# Patient Record
Sex: FEMALE | Race: WHITE | NOT HISPANIC OR LATINO | URBAN - METROPOLITAN AREA
[De-identification: names, ages, dates, MRNs, and addresses within clinical notes are randomized per-mention and may not be internally consistent; named-entity substitution may affect disease eponyms.]

---

## 2018-08-08 ENCOUNTER — OFFICE VISIT (OUTPATIENT)
Dept: URBAN - METROPOLITAN AREA CLINIC 193 | Facility: CLINIC | Age: 78
End: 2018-08-08
Payer: MEDICARE

## 2018-08-08 DIAGNOSIS — H52.223 REGULAR ASTIGMATISM, BILATERAL: ICD-10-CM

## 2018-08-08 PROCEDURE — 92134 CPTRZ OPH DX IMG PST SGM RTA: CPT | Performed by: OPTOMETRIST

## 2018-08-08 PROCEDURE — 92014 COMPRE OPH EXAM EST PT 1/>: CPT | Performed by: OPTOMETRIST

## 2018-08-08 ASSESSMENT — VISUAL ACUITY
OD: 20/30
OS: 20/25-

## 2018-08-08 ASSESSMENT — INTRAOCULAR PRESSURE
OD: 14
OS: 14

## 2018-08-08 NOTE — IMPRESSION/PLAN
Impression: Diagnosis: Nonexudative age-related macular degeneration, bilateral, early dry stage. Code: P19.1073. Hx of exudative/heme OS, grey SR lesion OD. Pt never went to see Dr. Baljeet Trujillo. OCT mac 8/8/18: no SRF/IRF Plan: Continue to monitor. Discused Amsler grid. Continue AREDS. Call if vision worsens.

## 2019-09-09 ENCOUNTER — OFFICE VISIT (OUTPATIENT)
Dept: URBAN - METROPOLITAN AREA CLINIC 71 | Facility: CLINIC | Age: 79
End: 2019-09-09
Payer: MEDICARE

## 2019-09-09 PROCEDURE — 92014 COMPRE OPH EXAM EST PT 1/>: CPT | Performed by: OPTOMETRIST

## 2019-09-09 PROCEDURE — 92134 CPTRZ OPH DX IMG PST SGM RTA: CPT | Performed by: OPTOMETRIST

## 2019-09-09 ASSESSMENT — INTRAOCULAR PRESSURE
OD: 10
OS: 11

## 2019-09-09 NOTE — IMPRESSION/PLAN
Impression: Diagnosis: Nonexudative age-related macular degeneration, bilateral, early dry stage. Code: D91.3333. Hx of exudative/heme OS, grey SR lesion OD. OCT mac 9/9/2019: no SRF/IRF OU Plan: Continue to monitor. Discussed Amsler grid. Continue AREDS. Call if vision worsens.

## 2021-06-18 ENCOUNTER — OFFICE VISIT (OUTPATIENT)
Dept: URBAN - METROPOLITAN AREA CLINIC 72 | Facility: CLINIC | Age: 81
End: 2021-06-18
Payer: MEDICARE

## 2021-06-18 DIAGNOSIS — H01.009 BLEPHARITIS OF EYELID: ICD-10-CM

## 2021-06-18 DIAGNOSIS — H01.8 OTHER SPECIFIED INFLAMMATIONS OF EYELID: Primary | ICD-10-CM

## 2021-06-18 PROCEDURE — 99212 OFFICE O/P EST SF 10 MIN: CPT | Performed by: OPTOMETRIST

## 2021-06-18 RX ORDER — NEOMYCIN SULFATE, POLYMYXIN B SULFATE AND DEXAMETHASONE 3.5; 10000; 1 MG/ML; [USP'U]/ML; MG/ML
SUSPENSION/ DROPS OPHTHALMIC
Qty: 1 | Refills: 0 | Status: ACTIVE
Start: 2021-06-18

## 2021-06-18 ASSESSMENT — INTRAOCULAR PRESSURE
OS: 12
OD: 10

## 2021-06-18 NOTE — IMPRESSION/PLAN
Impression: Other specified inflammations of eyelid: H01.8.  Plan: Recommend pt to start Tiago/poly/dex gtts 1 gtt TID x 1 wk then BID x 1 wk then will monitor for changes

## 2021-09-17 ENCOUNTER — OFFICE VISIT (OUTPATIENT)
Dept: URBAN - METROPOLITAN AREA CLINIC 71 | Facility: CLINIC | Age: 81
End: 2021-09-17
Payer: MEDICARE

## 2021-09-17 PROCEDURE — 92134 CPTRZ OPH DX IMG PST SGM RTA: CPT | Performed by: OPTOMETRIST

## 2021-09-17 PROCEDURE — 92014 COMPRE OPH EXAM EST PT 1/>: CPT | Performed by: OPTOMETRIST

## 2021-09-17 ASSESSMENT — INTRAOCULAR PRESSURE
OS: 15
OD: 15

## 2021-09-17 NOTE — IMPRESSION/PLAN
Impression: Diagnosis: Nonexudative age-related macular degeneration, bilateral, early dry stage. Code: S22.2868. Hx of exudative/heme OS, grey SR lesion OD. OCT mac 09/17/21: no SRF/IRF OU Plan: Continue to monitor. Discussed Amsler grid. Continue AREDS. Call if vision worsens.

## 2021-09-22 ENCOUNTER — OFFICE VISIT (OUTPATIENT)
Dept: URBAN - METROPOLITAN AREA CLINIC 71 | Facility: CLINIC | Age: 81
End: 2021-09-22
Payer: COMMERCIAL

## 2021-09-22 DIAGNOSIS — H35.3131 NONEXUDATIVE AGE-RELATED MACULAR DEGENERATION, BILATERAL, EARLY DRY STAGE: ICD-10-CM

## 2021-09-22 DIAGNOSIS — H16.223 KERATOCONJUNCTIVITIS SICCA, NOT SPECIFIED AS SJÖGREN'S, BILATERAL: ICD-10-CM

## 2021-09-22 PROCEDURE — 92012 INTRM OPH EXAM EST PATIENT: CPT | Performed by: OPTOMETRIST

## 2021-09-22 RX ORDER — CYCLOSPORINE 0.5 MG/ML
0.05 % EMULSION OPHTHALMIC
Qty: 180 | Refills: 3 | Status: ACTIVE
Start: 2021-09-22

## 2021-09-22 ASSESSMENT — VISUAL ACUITY
OD: 20/25
OS: 20/20

## 2021-09-22 NOTE — IMPRESSION/PLAN
Impression: Diagnosis: Nonexudative age-related macular degeneration, bilateral, early dry stage. Code: L49.5071. Hx of exudative/heme OS, grey SR lesion OD. OCT mac 09/17/21: no SRF/IRF OU Plan: Continue to monitor.  1 yr for DE/OCT mac

## 2021-11-22 ENCOUNTER — OFFICE VISIT (OUTPATIENT)
Dept: URBAN - METROPOLITAN AREA CLINIC 71 | Facility: CLINIC | Age: 81
End: 2021-11-22
Payer: MEDICARE

## 2021-11-22 DIAGNOSIS — Z96.1 PRESENCE OF INTRAOCULAR LENS: ICD-10-CM

## 2021-11-22 DIAGNOSIS — H16.213 EXPOSURE KERATOCONJUNCTIVITIS, BILATERAL: ICD-10-CM

## 2021-11-22 PROCEDURE — 99213 OFFICE O/P EST LOW 20 MIN: CPT | Performed by: OPTOMETRIST

## 2021-11-22 ASSESSMENT — KERATOMETRY
OS: 43.00
OD: 42.75

## 2021-11-22 ASSESSMENT — INTRAOCULAR PRESSURE
OD: 13
OS: 11

## 2021-11-22 NOTE — IMPRESSION/PLAN
Impression: Diagnosis: Nonexudative age-related macular degeneration, bilateral, early dry stage. Code: K92.3297. Hx of exudative/heme OS, grey SR lesion OD.  OCT mac 09/17/21: no SRF/IRF OU Plan: Continue to monitor with annual DE

## 2022-12-05 ENCOUNTER — OFFICE VISIT (OUTPATIENT)
Dept: URBAN - METROPOLITAN AREA CLINIC 71 | Facility: CLINIC | Age: 82
End: 2022-12-05
Payer: MEDICARE

## 2022-12-05 DIAGNOSIS — Z96.1 PRESENCE OF INTRAOCULAR LENS: ICD-10-CM

## 2022-12-05 DIAGNOSIS — H16.213 EXPOSURE KERATOCONJUNCTIVITIS, BILATERAL: ICD-10-CM

## 2022-12-05 DIAGNOSIS — H35.3131 NONEXUDATIVE AGE-RELATED MACULAR DEGENERATION, BILATERAL, EARLY DRY STAGE: Primary | ICD-10-CM

## 2022-12-05 PROCEDURE — 92014 COMPRE OPH EXAM EST PT 1/>: CPT | Performed by: OPTOMETRIST

## 2022-12-05 PROCEDURE — 92134 CPTRZ OPH DX IMG PST SGM RTA: CPT | Performed by: OPTOMETRIST

## 2022-12-05 ASSESSMENT — INTRAOCULAR PRESSURE
OD: 12
OS: 16

## 2022-12-05 NOTE — IMPRESSION/PLAN
Impression: Diagnosis: Nonexudative age-related macular degeneration, bilateral, early dry stage. Code: Z74.9130. Hx of exudative/heme OS, grey SR lesion OD. OCT mac 12/05/22: no SRF/IRF OU Plan: Discussed condition. Call if symptoms worsen. Will continue to monitor.

## 2022-12-05 NOTE — IMPRESSION/PLAN
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 Incomplete blink OU Plan: Blink more frequently and thoroughly, especially during extended near/computer work. Can use ATs up to QID OU for comfort.

## 2023-01-05 ENCOUNTER — OFFICE VISIT (OUTPATIENT)
Dept: URBAN - METROPOLITAN AREA CLINIC 71 | Facility: CLINIC | Age: 83
End: 2023-01-05
Payer: COMMERCIAL

## 2023-01-05 DIAGNOSIS — H52.223 REGULAR ASTIGMATISM, BILATERAL: Primary | ICD-10-CM

## 2023-01-05 DIAGNOSIS — H16.223 KERATOCONJUNCTIVITIS SICCA, NOT SPECIFIED AS SJÖGREN'S, BILATERAL: ICD-10-CM

## 2023-01-05 DIAGNOSIS — Z96.1 PRESENCE OF INTRAOCULAR LENS: ICD-10-CM

## 2023-01-05 DIAGNOSIS — H35.3131 NONEXUDATIVE AGE-RELATED MACULAR DEGENERATION, BILATERAL, EARLY DRY STAGE: ICD-10-CM

## 2023-01-05 PROCEDURE — 92012 INTRM OPH EXAM EST PATIENT: CPT | Performed by: OPTOMETRIST

## 2023-01-05 ASSESSMENT — INTRAOCULAR PRESSURE
OS: 13
OD: 11

## 2023-01-05 ASSESSMENT — VISUAL ACUITY
OD: 20/25
OS: 20/25

## 2023-01-05 NOTE — IMPRESSION/PLAN
Impression: Regular astigmatism, bilateral: H52.223. Plan: A glasses prescription has been discussed and generated. Adaptation period may be expected. Patient to call with any concerns.

## 2023-01-05 NOTE — IMPRESSION/PLAN
Impression: Diagnosis: Nonexudative age-related macular degeneration, bilateral, early dry stage. Code: I02.7090. Hx of exudative/heme OS, grey SR lesion OD. OCT mac 09/17/21: no SRF/IRF OU Plan: Continue to monitor with annual DE. Keep appt as scheduled for DE 12/2023.

## 2023-12-14 ENCOUNTER — OFFICE VISIT (OUTPATIENT)
Dept: URBAN - METROPOLITAN AREA CLINIC 71 | Facility: CLINIC | Age: 83
End: 2023-12-14
Payer: MEDICARE

## 2023-12-14 DIAGNOSIS — H16.213 EXPOSURE KERATOCONJUNCTIVITIS, BILATERAL: ICD-10-CM

## 2023-12-14 PROCEDURE — 92014 COMPRE OPH EXAM EST PT 1/>: CPT | Performed by: OPTOMETRIST

## 2023-12-14 PROCEDURE — 92134 CPTRZ OPH DX IMG PST SGM RTA: CPT | Performed by: OPTOMETRIST

## 2023-12-14 ASSESSMENT — INTRAOCULAR PRESSURE
OS: 13
OD: 11

## 2024-01-29 ENCOUNTER — OFFICE VISIT (OUTPATIENT)
Dept: URBAN - METROPOLITAN AREA CLINIC 71 | Facility: CLINIC | Age: 84
End: 2024-01-29
Payer: COMMERCIAL

## 2024-01-29 DIAGNOSIS — Z96.1 PRESENCE OF INTRAOCULAR LENS: ICD-10-CM

## 2024-01-29 DIAGNOSIS — H35.3131 NONEXUDATIVE AGE-RELATED MACULAR DEGENERATION, BILATERAL, EARLY DRY STAGE: ICD-10-CM

## 2024-01-29 DIAGNOSIS — H52.223 REGULAR ASTIGMATISM, BILATERAL: Primary | ICD-10-CM

## 2024-01-29 PROCEDURE — 92012 INTRM OPH EXAM EST PATIENT: CPT | Performed by: OPTOMETRIST

## 2024-01-29 ASSESSMENT — INTRAOCULAR PRESSURE
OD: 10
OS: 11

## 2024-01-29 ASSESSMENT — VISUAL ACUITY
OS: 20/20
OD: 20/25